# Patient Record
Sex: MALE | Race: BLACK OR AFRICAN AMERICAN | Employment: UNEMPLOYED | URBAN - METROPOLITAN AREA
[De-identification: names, ages, dates, MRNs, and addresses within clinical notes are randomized per-mention and may not be internally consistent; named-entity substitution may affect disease eponyms.]

---

## 2019-08-13 ENCOUNTER — HOSPITAL ENCOUNTER (EMERGENCY)
Facility: CLINIC | Age: 49
Discharge: HOME | End: 2019-08-13
Attending: EMERGENCY MEDICINE | Admitting: EMERGENCY MEDICINE

## 2019-08-13 VITALS
WEIGHT: 191 LBS | HEART RATE: 79 BPM | OXYGEN SATURATION: 100 % | SYSTOLIC BLOOD PRESSURE: 126 MMHG | DIASTOLIC BLOOD PRESSURE: 93 MMHG | RESPIRATION RATE: 16 BRPM | TEMPERATURE: 98.6 F

## 2019-08-13 DIAGNOSIS — F11.20 HEROIN ADDICTION (H): ICD-10-CM

## 2019-08-13 DIAGNOSIS — N28.9 DECREASED RENAL FUNCTION: ICD-10-CM

## 2019-08-13 LAB
ALBUMIN SERPL-MCNC: 4.2 G/DL (ref 3.4–5)
ALP SERPL-CCNC: 86 U/L (ref 40–150)
ALT SERPL W P-5'-P-CCNC: 21 U/L (ref 0–70)
AMPHETAMINES UR QL SCN: NEGATIVE
ANION GAP SERPL CALCULATED.3IONS-SCNC: 10 MMOL/L (ref 3–14)
AST SERPL W P-5'-P-CCNC: 15 U/L (ref 0–45)
BARBITURATES UR QL: NEGATIVE
BASOPHILS # BLD AUTO: 0 10E9/L (ref 0–0.2)
BASOPHILS NFR BLD AUTO: 0.3 %
BENZODIAZ UR QL: NEGATIVE
BILIRUB SERPL-MCNC: 1.2 MG/DL (ref 0.2–1.3)
BUN SERPL-MCNC: 24 MG/DL (ref 7–30)
CALCIUM SERPL-MCNC: 9.2 MG/DL (ref 8.5–10.1)
CANNABINOIDS UR QL SCN: NEGATIVE
CHLORIDE SERPL-SCNC: 104 MMOL/L (ref 94–109)
CO2 SERPL-SCNC: 24 MMOL/L (ref 20–32)
COCAINE UR QL: NEGATIVE
CREAT SERPL-MCNC: 1.98 MG/DL (ref 0.66–1.25)
DIFFERENTIAL METHOD BLD: ABNORMAL
EOSINOPHIL # BLD AUTO: 0.3 10E9/L (ref 0–0.7)
EOSINOPHIL NFR BLD AUTO: 2.3 %
ERYTHROCYTE [DISTWIDTH] IN BLOOD BY AUTOMATED COUNT: 12.7 % (ref 10–15)
ETHANOL UR QL SCN: NEGATIVE
GFR SERPL CREATININE-BSD FRML MDRD: 39 ML/MIN/{1.73_M2}
GLUCOSE SERPL-MCNC: 207 MG/DL (ref 70–99)
HCT VFR BLD AUTO: 50 % (ref 40–53)
HGB BLD-MCNC: 17.2 G/DL (ref 13.3–17.7)
IMM GRANULOCYTES # BLD: 0 10E9/L (ref 0–0.4)
IMM GRANULOCYTES NFR BLD: 0.3 %
LYMPHOCYTES # BLD AUTO: 3.9 10E9/L (ref 0.8–5.3)
LYMPHOCYTES NFR BLD AUTO: 34.7 %
MCH RBC QN AUTO: 32.4 PG (ref 26.5–33)
MCHC RBC AUTO-ENTMCNC: 34.4 G/DL (ref 31.5–36.5)
MCV RBC AUTO: 94 FL (ref 78–100)
MONOCYTES # BLD AUTO: 0.6 10E9/L (ref 0–1.3)
MONOCYTES NFR BLD AUTO: 5 %
NEUTROPHILS # BLD AUTO: 6.5 10E9/L (ref 1.6–8.3)
NEUTROPHILS NFR BLD AUTO: 57.4 %
NRBC # BLD AUTO: 0 10*3/UL
NRBC BLD AUTO-RTO: 0 /100
OPIATES UR QL SCN: POSITIVE
PLATELET # BLD AUTO: 218 10E9/L (ref 150–450)
POTASSIUM SERPL-SCNC: 3.1 MMOL/L (ref 3.4–5.3)
PROT SERPL-MCNC: 8.3 G/DL (ref 6.8–8.8)
RBC # BLD AUTO: 5.31 10E12/L (ref 4.4–5.9)
SODIUM SERPL-SCNC: 138 MMOL/L (ref 133–144)
WBC # BLD AUTO: 11.3 10E9/L (ref 4–11)

## 2019-08-13 PROCEDURE — 80320 DRUG SCREEN QUANTALCOHOLS: CPT | Performed by: EMERGENCY MEDICINE

## 2019-08-13 PROCEDURE — 80053 COMPREHEN METABOLIC PANEL: CPT | Performed by: EMERGENCY MEDICINE

## 2019-08-13 PROCEDURE — 99283 EMERGENCY DEPT VISIT LOW MDM: CPT | Mod: Z6 | Performed by: EMERGENCY MEDICINE

## 2019-08-13 PROCEDURE — 99283 EMERGENCY DEPT VISIT LOW MDM: CPT | Performed by: EMERGENCY MEDICINE

## 2019-08-13 PROCEDURE — 80307 DRUG TEST PRSMV CHEM ANLYZR: CPT | Performed by: EMERGENCY MEDICINE

## 2019-08-13 PROCEDURE — 85025 COMPLETE CBC W/AUTO DIFF WBC: CPT | Performed by: EMERGENCY MEDICINE

## 2019-08-13 PROCEDURE — 25000132 ZZH RX MED GY IP 250 OP 250 PS 637: Performed by: EMERGENCY MEDICINE

## 2019-08-13 RX ORDER — CLONIDINE HYDROCHLORIDE 0.1 MG/1
0.1 TABLET ORAL 2 TIMES DAILY
Qty: 10 TABLET | Refills: 0 | Status: SHIPPED | OUTPATIENT
Start: 2019-08-13 | End: 2019-08-16

## 2019-08-13 RX ORDER — ONDANSETRON 4 MG/1
4 TABLET, ORALLY DISINTEGRATING ORAL EVERY 8 HOURS PRN
Qty: 10 TABLET | Refills: 0 | Status: SHIPPED | OUTPATIENT
Start: 2019-08-13 | End: 2019-08-16

## 2019-08-13 RX ORDER — POTASSIUM CHLORIDE 20MEQ/15ML
20 LIQUID (ML) ORAL ONCE
Status: COMPLETED | OUTPATIENT
Start: 2019-08-13 | End: 2019-08-13

## 2019-08-13 RX ADMIN — POTASSIUM CHLORIDE 20 MEQ: 1.5 SOLUTION ORAL at 14:27

## 2019-08-13 NOTE — ED AVS SNAPSHOT
[facility], [place], Emergency Department  [address]  [place] [address]  Phone:  [phone]  Fax:  [phone]                                    Jose Luis Whatley   MRN: [id number]    Department:  [facility], Emergency Department   Date of Visit:  8/13/2019           After Visit Summary Signature Page    I have received my discharge instructions, and my questions have been answered. I have discussed any challenges I see with this plan with the nurse or doctor.    ..........................................................................................................................................  Patient/Patient Representative Signature      ..........................................................................................................................................  Patient Representative Print Name and Relationship to Patient    ..................................................               ................................................  Date                                   Time    ..........................................................................................................................................  Reviewed by Signature/Title    ...................................................              ..............................................  Date                                               Time          22EPIC Rev 08/18

## 2019-08-13 NOTE — ED PROVIDER NOTES
History     Chief Complaint   Patient presents with     Addiction Problem     seeking detox off heroin(last use last nurys, snorted) uses 1 gram daily     HPI  Jose Luis Whatley is a 49 year old male who presents looking for detox.  Patient believes he has a detox bed on the unit however per intake there is no bed available.  Patient is looking for treatment for heroin abuse.  He denies any symptoms currently he reports using approximately 1/2 g a day, snorting it, no injections.  He has gone through withdrawals in the past which she notes he has vomiting, diarrhea, and diffuse body aches and fatigue.  He denies any of those symptoms currently and states he is currently feeling well.  He has no other medical complaints.    I have reviewed the Medications, Allergies, Past Medical and Surgical History, and Social History in the Epic system.    Review of Systems   All other systems reviewed and are negative.      Physical Exam   BP: (!) 134/90  Pulse: 78  Temp: 97.3  F (36.3  C)  Resp: 14  Weight: 86.6 kg (191 lb)  SpO2: 98 %      Physical Exam   General: awake, alert, NAD  Head: normal cephalic  HEENT: pupils equal, conjugate gaze in tact  Neck: Supple  CV: regular rate and rhythm without murmur  Lungs: clear to ascultation  Abd: soft, non-tender, no guarding, no peritoneal signs  EXT: No deformities noted.  Neuro: awake, answers questions appropriately. No focal deficits noted         ED Course        Procedures             Labs Ordered and Resulted from Time of ED Arrival Up to the Time of Departure from the ED   CBC WITH PLATELETS DIFFERENTIAL - Abnormal; Notable for the following components:       Result Value    WBC 11.3 (*)     All other components within normal limits   COMPREHENSIVE METABOLIC PANEL - Abnormal; Notable for the following components:    Potassium 3.1 (*)     Glucose 207 (*)     Creatinine 1.98 (*)     GFR Estimate 39 (*)     GFR Estimate If Black 45 (*)     All other components within normal  limits            Assessments & Plan (with Medical Decision Making)   Jose Luis is a 49-year-old male who is trying to establish detox for heroin.  Unfortunately he does not have a detox bed on hold like he thought he did.  Patient has no acute complaints.  In anticipation that he was going to go to detox basic labs were obtained.  He has a slightly elevated white count but has no associated infectious complaints so will not work this up any additionally.  He also has a low potassium, will  him on eating high potassium foods and having this rechecked in a couple of days.  He was given 1 dose of oral potassium here in the ER.  Of note patient also had a slightly elevated creatinine of 1.98, no previous in our system to compare to but patient reports he has been told in the past that he has CKD.  I gave him primary care follow-up for this.    Currently he is asymptomatic.  We were able to find a bed available at [address] however patient declined this bed.  Will give a prescription for clonidine and Zofran in the event that he develops symptoms and give them the detox number so they can try tomorrow morning to reserve a bed on detox.  He was encouraged to return to the ER should he develop new or worsening symptoms.    I have reviewed the nursing notes.    I have reviewed the findings, diagnosis, plan and need for follow up with the patient.    There are no discharge medications for this patient.      Final diagnoses:   Heroin addiction (H)   Decreased renal function       8/13/2019   [facility], [place], EMERGENCY DEPARTMENT     Tab Persaud MD  08/13/19 5499

## 2019-08-13 NOTE — DISCHARGE INSTRUCTIONS
No detox bed is available today.  I will give you clonidine and Zofran for symptomatic management should your withdrawal symptoms start and encourage you to call detox tomorrow morning at [phone] to reserve a bed.  Also your potassium was slightly low and your kidney function was slightly low, unclear if this is a new problem or an old problem as we have no previous comparisons but you should make sure this is followed.  I would start by establishing primary care and having labs rechecked.  Further testing may be necessary in the future.    Please make an appointment to follow up with [facility] (phone: [phone] OR [facility] (phone: [phone]) as soon as possible. Return to the ED if unable to tolerate p.o. or new or worsening symptoms.

## 2019-08-16 ENCOUNTER — HOSPITAL ENCOUNTER (INPATIENT)
Facility: CLINIC | Age: 49
LOS: 3 days | Discharge: HOME | DRG: 897 | End: 2019-08-19
Attending: PSYCHIATRY & NEUROLOGY | Admitting: PSYCHIATRY & NEUROLOGY

## 2019-08-16 DIAGNOSIS — F11.20 UNCOMPLICATED OPIOID DEPENDENCE (H): ICD-10-CM

## 2019-08-16 PROBLEM — F19.10 SUBSTANCE ABUSE (H): Status: ACTIVE | Noted: 2019-08-16

## 2019-08-16 LAB
ALBUMIN SERPL-MCNC: 3.8 G/DL (ref 3.4–5)
ALCOHOL BREATH TEST: 0.03 (ref 0–0.01)
ALP SERPL-CCNC: 80 U/L (ref 40–150)
ALT SERPL W P-5'-P-CCNC: 20 U/L (ref 0–70)
AMPHETAMINES UR QL SCN: NEGATIVE
ANION GAP SERPL CALCULATED.3IONS-SCNC: 8 MMOL/L (ref 3–14)
AST SERPL W P-5'-P-CCNC: 12 U/L (ref 0–45)
BARBITURATES UR QL: NEGATIVE
BASOPHILS # BLD AUTO: 0 10E9/L (ref 0–0.2)
BASOPHILS NFR BLD AUTO: 0.4 %
BENZODIAZ UR QL: NEGATIVE
BILIRUB SERPL-MCNC: 0.8 MG/DL (ref 0.2–1.3)
BUN SERPL-MCNC: 17 MG/DL (ref 7–30)
CALCIUM SERPL-MCNC: 8.6 MG/DL (ref 8.5–10.1)
CANNABINOIDS UR QL SCN: NEGATIVE
CHLORIDE SERPL-SCNC: 108 MMOL/L (ref 94–109)
CO2 SERPL-SCNC: 24 MMOL/L (ref 20–32)
COCAINE UR QL: NEGATIVE
CREAT SERPL-MCNC: 1.15 MG/DL (ref 0.66–1.25)
DIFFERENTIAL METHOD BLD: NORMAL
EOSINOPHIL # BLD AUTO: 0.2 10E9/L (ref 0–0.7)
EOSINOPHIL NFR BLD AUTO: 2.7 %
ERYTHROCYTE [DISTWIDTH] IN BLOOD BY AUTOMATED COUNT: 12.3 % (ref 10–15)
ETHANOL UR QL SCN: NEGATIVE
GFR SERPL CREATININE-BSD FRML MDRD: 75 ML/MIN/{1.73_M2}
GLUCOSE SERPL-MCNC: 90 MG/DL (ref 70–99)
HCT VFR BLD AUTO: 43.2 % (ref 40–53)
HGB BLD-MCNC: 15.2 G/DL (ref 13.3–17.7)
IMM GRANULOCYTES # BLD: 0 10E9/L (ref 0–0.4)
IMM GRANULOCYTES NFR BLD: 0.1 %
LYMPHOCYTES # BLD AUTO: 3 10E9/L (ref 0.8–5.3)
LYMPHOCYTES NFR BLD AUTO: 44.8 %
MCH RBC QN AUTO: 32.5 PG (ref 26.5–33)
MCHC RBC AUTO-ENTMCNC: 35.2 G/DL (ref 31.5–36.5)
MCV RBC AUTO: 93 FL (ref 78–100)
MONOCYTES # BLD AUTO: 0.3 10E9/L (ref 0–1.3)
MONOCYTES NFR BLD AUTO: 5 %
NEUTROPHILS # BLD AUTO: 3.2 10E9/L (ref 1.6–8.3)
NEUTROPHILS NFR BLD AUTO: 47 %
NRBC # BLD AUTO: 0 10*3/UL
NRBC BLD AUTO-RTO: 0 /100
OPIATES UR QL SCN: POSITIVE
PLATELET # BLD AUTO: 213 10E9/L (ref 150–450)
POTASSIUM SERPL-SCNC: 3.8 MMOL/L (ref 3.4–5.3)
PROT SERPL-MCNC: 7.6 G/DL (ref 6.8–8.8)
RBC # BLD AUTO: 4.67 10E12/L (ref 4.4–5.9)
SODIUM SERPL-SCNC: 140 MMOL/L (ref 133–144)
WBC # BLD AUTO: 6.8 10E9/L (ref 4–11)

## 2019-08-16 PROCEDURE — 80307 DRUG TEST PRSMV CHEM ANLYZR: CPT | Performed by: PSYCHIATRY & NEUROLOGY

## 2019-08-16 PROCEDURE — HZ2ZZZZ DETOXIFICATION SERVICES FOR SUBSTANCE ABUSE TREATMENT: ICD-10-PCS | Performed by: NURSE PRACTITIONER

## 2019-08-16 PROCEDURE — 99285 EMERGENCY DEPT VISIT HI MDM: CPT | Performed by: PSYCHIATRY & NEUROLOGY

## 2019-08-16 PROCEDURE — 99285 EMERGENCY DEPT VISIT HI MDM: CPT | Mod: Z6 | Performed by: PSYCHIATRY & NEUROLOGY

## 2019-08-16 PROCEDURE — 80053 COMPREHEN METABOLIC PANEL: CPT | Performed by: PSYCHIATRY & NEUROLOGY

## 2019-08-16 PROCEDURE — 12800008 ZZH R&B CD ADULT

## 2019-08-16 PROCEDURE — 82075 ASSAY OF BREATH ETHANOL: CPT | Performed by: PSYCHIATRY & NEUROLOGY

## 2019-08-16 PROCEDURE — 85025 COMPLETE CBC W/AUTO DIFF WBC: CPT | Performed by: PSYCHIATRY & NEUROLOGY

## 2019-08-16 PROCEDURE — 80320 DRUG SCREEN QUANTALCOHOLS: CPT | Performed by: PSYCHIATRY & NEUROLOGY

## 2019-08-16 RX ORDER — BISACODYL 10 MG
10 SUPPOSITORY, RECTAL RECTAL DAILY PRN
Status: DISCONTINUED | OUTPATIENT
Start: 2019-08-16 | End: 2019-08-19 | Stop reason: HOSPADM

## 2019-08-16 RX ORDER — ONDANSETRON 4 MG/1
4 TABLET, ORALLY DISINTEGRATING ORAL EVERY 6 HOURS PRN
Status: DISCONTINUED | OUTPATIENT
Start: 2019-08-16 | End: 2019-08-19 | Stop reason: HOSPADM

## 2019-08-16 RX ORDER — TRAZODONE HYDROCHLORIDE 50 MG/1
50 TABLET, FILM COATED ORAL
Status: DISCONTINUED | OUTPATIENT
Start: 2019-08-16 | End: 2019-08-19 | Stop reason: HOSPADM

## 2019-08-16 RX ORDER — LOPERAMIDE HCL 2 MG
2 CAPSULE ORAL 4 TIMES DAILY PRN
Status: DISCONTINUED | OUTPATIENT
Start: 2019-08-16 | End: 2019-08-19 | Stop reason: HOSPADM

## 2019-08-16 RX ORDER — ALUMINA, MAGNESIA, AND SIMETHICONE 2400; 2400; 240 MG/30ML; MG/30ML; MG/30ML
30 SUSPENSION ORAL EVERY 4 HOURS PRN
Status: DISCONTINUED | OUTPATIENT
Start: 2019-08-16 | End: 2019-08-19 | Stop reason: HOSPADM

## 2019-08-16 RX ORDER — HYDROXYZINE HYDROCHLORIDE 25 MG/1
25 TABLET, FILM COATED ORAL EVERY 4 HOURS PRN
Status: DISCONTINUED | OUTPATIENT
Start: 2019-08-16 | End: 2019-08-19 | Stop reason: HOSPADM

## 2019-08-16 RX ORDER — BUPRENORPHINE 2 MG/1
4 TABLET SUBLINGUAL ONCE
Status: COMPLETED | OUTPATIENT
Start: 2019-08-17 | End: 2019-08-17

## 2019-08-16 RX ORDER — POLYETHYLENE GLYCOL 3350 17 G
2 POWDER IN PACKET (EA) ORAL
Status: DISCONTINUED | OUTPATIENT
Start: 2019-08-16 | End: 2019-08-19 | Stop reason: HOSPADM

## 2019-08-16 RX ORDER — CLONIDINE HYDROCHLORIDE 0.1 MG/1
0.1 TABLET ORAL EVERY 6 HOURS PRN
Status: DISCONTINUED | OUTPATIENT
Start: 2019-08-16 | End: 2019-08-19 | Stop reason: HOSPADM

## 2019-08-16 RX ORDER — ACETAMINOPHEN 325 MG/1
650 TABLET ORAL EVERY 4 HOURS PRN
Status: DISCONTINUED | OUTPATIENT
Start: 2019-08-16 | End: 2019-08-19 | Stop reason: HOSPADM

## 2019-08-16 SDOH — HEALTH STABILITY: MENTAL HEALTH: HOW OFTEN DO YOU HAVE A DRINK CONTAINING ALCOHOL?: NEVER

## 2019-08-16 ASSESSMENT — ACTIVITIES OF DAILY LIVING (ADL)
FALL_HISTORY_WITHIN_LAST_SIX_MONTHS: NO
SWALLOWING: 0-->SWALLOWS FOODS/LIQUIDS WITHOUT DIFFICULTY
HYGIENE/GROOMING: INDEPENDENT
BATHING: 0-->INDEPENDENT
COGNITION: 0 - NO COGNITION ISSUES REPORTED
ORAL_HYGIENE: INDEPENDENT
AMBULATION: 0-->INDEPENDENT
RETIRED_COMMUNICATION: 0-->UNDERSTANDS/COMMUNICATES WITHOUT DIFFICULTY
DRESS: INDEPENDENT
RETIRED_EATING: 0-->INDEPENDENT
TOILETING: 0-->INDEPENDENT
DRESS: 0-->INDEPENDENT
TRANSFERRING: 0-->INDEPENDENT

## 2019-08-16 ASSESSMENT — ENCOUNTER SYMPTOMS
HALLUCINATIONS: 0
SHORTNESS OF BREATH: 0
NERVOUS/ANXIOUS: 0
DYSPHORIC MOOD: 0
ABDOMINAL PAIN: 0
FEVER: 0

## 2019-08-16 ASSESSMENT — MIFFLIN-ST. JEOR: SCORE: 1797.05

## 2019-08-16 NOTE — ED PROVIDER NOTES
History     Chief Complaint   Patient presents with     Addiction Problem     Has a bed on detox for Heroin use, intake has been notified pt is in ED. Uses 1.5 grams daily, smoked. Last use: today around 9:30 am. Denies history of seizures.      The history is provided by the patient and medical records.     Jose Luis Whatley is a 49 year old male who comes in due to him wanting to go to detox.  He has been using 1.5 grams of heroin a day for the last 4 years. He snorts it.  His last use was at 930 am today. He denies any withdrawal symptoms yet. He denies any history of seizures.  He states he has one working kidney but could not explain what is wrong with the other kidney. He denies any depression or anxiety.  He denies any suicidal or homicidal thoughts.  He denies any other drug use. He does occasionally have a few beers during social events.  He had a beer this morning with a breathalyzer of 0.025.  He denies this is a regular occurrence.    I have reviewed the Medications, Allergies, Past Medical and Surgical History, and Social History in the Epic system.    Review of Systems   Constitutional: Negative for fever.   Respiratory: Negative for shortness of breath.    Cardiovascular: Negative for chest pain.   Gastrointestinal: Negative for abdominal pain.   Psychiatric/Behavioral: Negative for dysphoric mood, hallucinations, self-injury and suicidal ideas. The patient is not nervous/anxious.    All other systems reviewed and are negative.      Physical Exam   BP: 126/75  Pulse: 75  Temp: 97.9  F (36.6  C)  Resp: 16  Weight: 88 kg (194 lb)  SpO2: 99 %      Physical Exam   Constitutional: He appears well-developed and well-nourished.   Cardiovascular: Normal rate, regular rhythm and normal heart sounds.   Pulmonary/Chest: Effort normal and breath sounds normal. No respiratory distress.   Psychiatric: He has a normal mood and affect. His speech is normal and behavior is normal. Judgment and thought content  normal. He is not actively hallucinating. Thought content is not paranoid and not delusional. Cognition and memory are normal. He expresses no homicidal and no suicidal ideation. He expresses no suicidal plans and no homicidal plans.   Nursing note and vitals reviewed.      ED Course        Procedures               Labs Ordered and Resulted from Time of ED Arrival Up to the Time of Departure from the ED   DRUG ABUSE SCREEN 6 CHEM DEP URINE ([facility]) - Abnormal; Notable for the following components:       Result Value    Opiates Qualitative Urine Positive (*)     All other components within normal limits   ALCOHOL BREATH TEST POCT - Abnormal; Notable for the following components:    Alcohol Breath Test 0.025 (*)     All other components within normal limits   CBC WITH PLATELETS DIFFERENTIAL   COMPREHENSIVE METABOLIC PANEL            Assessments & Plan (with Medical Decision Making)   Jose Luis will be admitted to the hospital for detox on station 3a under Dr. Mart.    I have reviewed the nursing notes.    I have reviewed the findings, diagnosis, plan and need for follow up with the patient.    New Prescriptions    No medications on file       Final diagnoses:   Uncomplicated opioid dependence (H)       8/16/2019   [facility], [place], EMERGENCY DEPARTMENT     Darius Alfaro MD  08/16/19 1854

## 2019-08-16 NOTE — ED NOTES
ED to Behavioral Floor Handoff    SITUATION  Jose Luis Whatley is a 49 year old male who speaks English and lives in a home with others The patient arrived in the ED by private car from home with a complaint of Addiction Problem (Has a bed on detox for Heroin use, intake has been notified pt is in ED. Uses 1.5 grams daily, smoked. Last use: today around 9:30 am. Denies history of seizures. )  .The patient's current symptoms started/worsened a while ago and during this time the symptoms have remained the same.   In the ED, pt was diagnosed with   Final diagnoses:   Uncomplicated opioid dependence (H)        Initial vitals were: BP: 126/75  Pulse: 75  Temp: 97.9  F (36.6  C)  Resp: 16  Weight: 88 kg (194 lb)  SpO2: 99 %   --------  Is the patient diabetic? No   If yes, last blood glucose? --     If yes, was this treated in the ED? --  --------  Is the patient inebriated (ETOH) No or Impaired on other substances? No  MSSA done? N/A  Last MSSA score: --    Were withdrawal symptoms treated? N/A  Does the patient have a seizure history? No. If yes, date of most recent seizure--  --------  Is the patient patient experiencing suicidal ideation? denies current or recent suicidal ideation     Homicidal ideation? denies current or recent homicidal ideation or behaviors.    Self-injurious behavior/urges? denies current or recent self injurious behavior or ideation.  ------  Was pt aggressive in the ED No  Was a code called No  Is the pt now cooperative? Yes  -------  Meds given in ED: Medications - No data to display   Family present during ED course? No  Family currently present? No    BACKGROUND  Does the patient have a cognitive impairment or developmental disability? No  Allergies: No Known Allergies.   Social demographics are   Social History     Socioeconomic History     Marital status: Single     Spouse name: None     Number of children: None     Years of education: None     Highest education level: None   Occupational  History     None   Social Needs     Financial resource strain: None     Food insecurity:     Worry: None     Inability: None     Transportation needs:     Medical: None     Non-medical: None   Tobacco Use     Smoking status: Current Every Day Smoker     Smokeless tobacco: Never Used   Substance and Sexual Activity     Alcohol use: Never     Frequency: Never     Drug use: Yes     Types: Opiates     Sexual activity: None   Lifestyle     Physical activity:     Days per week: None     Minutes per session: None     Stress: None   Relationships     Social connections:     Talks on phone: None     Gets together: None     Attends Cheondo[employer] service: None     Active member of club or organization: None     Attends meetings of clubs or organizations: None     Relationship status: None     Intimate partner violence:     Fear of current or ex partner: None     Emotionally abused: None     Physically abused: None     Forced sexual activity: None   Other Topics Concern     None   Social History Narrative     None        ASSESSMENT  Labs results   Labs Ordered and Resulted from Time of ED Arrival Up to the Time of Departure from the ED   DRUG ABUSE SCREEN 6 CHEM DEP URINE ([facility]) - Abnormal; Notable for the following components:       Result Value    Opiates Qualitative Urine Positive (*)     All other components within normal limits   ALCOHOL BREATH TEST POCT - Abnormal; Notable for the following components:    Alcohol Breath Test 0.025 (*)     All other components within normal limits   CBC WITH PLATELETS DIFFERENTIAL   COMPREHENSIVE METABOLIC PANEL      Imaging Studies: No results found for this or any previous visit (from the past 24 hour(s)).   Most recent vital signs /75   Pulse 75   Temp 97.9  F (36.6  C) (Oral)   Resp 16   Wt 88 kg (194 lb)   SpO2 99%    Abnormal labs/tests/findings requiring intervention:---   Pain control: pt had none  Nausea control: pt had none    RECOMMENDATION  Are any infection precautions  needed (MRSA, VRE, etc.)? No If yes, what infection? --  ---  Does the patient have mobility issues? independently. If yes, what device does the pt use? ---  ---  Is patient on 72 hour hold or commitment? No If on 72 hour hold, have hold and rights been given to patient? N/A  Are admitting orders written if after 10 p.m. ?N/A  Tasks needing to be completed:---     Zaki Perera    [phone] [facility]   [phone] [facility]

## 2019-08-16 NOTE — PROGRESS NOTES
08/16/19 1844   Patient Belongings   Did you bring any home meds/supplements to the hospital?  No   Patient Belongings locker;other (see comments)   Patient Belongings Put in Hospital Secure Location (Security or Locker, etc.) other (see comments)   Belongings Search Yes   Clothing Search Yes   Second Staff John and Darius    Comment see note    Storage Bin   Shoes w/laces, belt, hat, durag, sunglasses  Medical Bin  Cell phone, wallet, No    Security Envelope  Nothing!!  A             Admission:  I am responsible for any personal items that are not sent to the safe or pharmacy.  [facility] is not responsible for loss, theft or damage of any property in my possession.  Signature:  _________________________________ Date: _______  Time: _____                                              Staff Signature:  ____________________________ Date: ________  Time: _____      2nd Staff person, if patient is unable/unwilling to sign:    Signature: ________________________________ Date: ________  Time: _____   Discharge:  [facility] has returned all of my personal belongings:  Signature: _________________________________ Date: ________  Time: _____                                          Staff Signature:  ____________________________ Date: ________  Time: _____

## 2019-08-16 NOTE — PHARMACY-ADMISSION MEDICATION HISTORY
Admission medication history for the August 16, 2019 admission is complete.     Medication history interview sources: patient, [employer]    Medication compliance: N/A - patient not prescribed any routine medications    Changes made to PTA medication list (reason)  Added: none  Deleted:   - clonidine 0.1 mg BID (prescribed 5 day supply in ED on 8/13/19 for opioid withdrawal - patient did not fill)  - ondansetron 4 mg ODT q8h PRN (prescribed #10 in ED on 8/13/19 - patient did not fill)  Changed: none    Additional medication history information (including reliability of information, actions taken by pharmacist):  - Patient denies taking/being prescribed prescription medications and over-the-counter (OTC) products such as vitamins, sleep aids, etc.      Prior to Admission medications    Not on File       Time spent: 15 minutes    Medication history completed by:   Pedro Washburn, PharmD

## 2019-08-17 LAB
T4 FREE SERPL-MCNC: 1.09 NG/DL (ref 0.76–1.46)
TSH SERPL DL<=0.005 MIU/L-ACNC: 0.22 MU/L (ref 0.4–4)

## 2019-08-17 PROCEDURE — 99222 1ST HOSP IP/OBS MODERATE 55: CPT | Mod: AI | Performed by: PSYCHIATRY & NEUROLOGY

## 2019-08-17 PROCEDURE — 84443 ASSAY THYROID STIM HORMONE: CPT | Performed by: NURSE PRACTITIONER

## 2019-08-17 PROCEDURE — 25000131 ZZH RX MED GY IP 250 OP 636 PS 637: Performed by: NURSE PRACTITIONER

## 2019-08-17 PROCEDURE — 25000132 ZZH RX MED GY IP 250 OP 250 PS 637: Performed by: NURSE PRACTITIONER

## 2019-08-17 PROCEDURE — 99232 SBSQ HOSP IP/OBS MODERATE 35: CPT | Performed by: NURSE PRACTITIONER

## 2019-08-17 PROCEDURE — 25000132 ZZH RX MED GY IP 250 OP 250 PS 637: Performed by: PSYCHIATRY & NEUROLOGY

## 2019-08-17 PROCEDURE — 36415 COLL VENOUS BLD VENIPUNCTURE: CPT | Performed by: NURSE PRACTITIONER

## 2019-08-17 PROCEDURE — 99207 ZZC CDG-HISTORY COMP: MEETS EXP. PROBLEM FOCUSED - DOWN CODED LACK OF HPI: CPT | Performed by: NURSE PRACTITIONER

## 2019-08-17 PROCEDURE — 84439 ASSAY OF FREE THYROXINE: CPT | Performed by: NURSE PRACTITIONER

## 2019-08-17 PROCEDURE — 12800008 ZZH R&B CD ADULT

## 2019-08-17 PROCEDURE — 99207 ZZC CDG-MDM COMPONENT: MEETS MODERATE - DOWN CODED: CPT | Performed by: PSYCHIATRY & NEUROLOGY

## 2019-08-17 RX ORDER — BUPRENORPHINE 2 MG/1
4 TABLET SUBLINGUAL 2 TIMES DAILY
Status: DISCONTINUED | OUTPATIENT
Start: 2019-08-17 | End: 2019-08-19 | Stop reason: HOSPADM

## 2019-08-17 RX ORDER — NALOXONE HYDROCHLORIDE 0.4 MG/ML
.1-.4 INJECTION, SOLUTION INTRAMUSCULAR; INTRAVENOUS; SUBCUTANEOUS
Status: DISCONTINUED | OUTPATIENT
Start: 2019-08-17 | End: 2019-08-19 | Stop reason: HOSPADM

## 2019-08-17 RX ADMIN — BUPRENORPHINE HYDROCHLORIDE 4 MG: 2 TABLET SUBLINGUAL at 20:22

## 2019-08-17 RX ADMIN — BUPRENORPHINE HCL 4 MG: 2 TABLET SUBLINGUAL at 09:31

## 2019-08-17 RX ADMIN — ONDANSETRON 4 MG: 4 TABLET, ORALLY DISINTEGRATING ORAL at 20:21

## 2019-08-17 RX ADMIN — NICOTINE POLACRILEX 2 MG: 2 LOZENGE ORAL at 12:21

## 2019-08-17 ASSESSMENT — ACTIVITIES OF DAILY LIVING (ADL)
ORAL_HYGIENE: INDEPENDENT
LAUNDRY: WITH SUPERVISION
DRESS: SCRUBS (BEHAVIORAL HEALTH);INDEPENDENT
HYGIENE/GROOMING: HANDWASHING;INDEPENDENT

## 2019-08-17 NOTE — H&P
Admitted:     08/16/2019      CHIEF COMPLAINT:  A 49-year-old man admitted for opiate dependence here to get on to buprenorphine and wants to be on buprenorphine maintenance outside the hospital.      HISTORY OF PRESENT ILLNESS:  The patient is a 49-year-old man who presents for buprenorphine initiation.  He is not interested in residential treatment.  He just wants to get into a Suboxone clinic.  He has been snorting between a gram and 1.5 grams of heroin daily.  He is a daily tobacco user.  Denies other illicit drug use.  The patient has been using more than intended despite intentions to quit.  He has notable withdrawal symptoms.      PAST PSYCHIATRIC HISTORY:  The patient denies any psychiatric issues outside of his chemical dependency.  Denies being in treatment in the past.  Denies being in detox in the past.  Denies past suicide attempts.      PAST MEDICAL HISTORY:  The patient has 1 functioning kidney, subclinical hypothyroidism noted on admission this time.  No other medical issues.      SUBSTANCE HISTORY:  Substance use as noted in HPI.      PHYSICAL REVIEW OF SYSTEMS:  The patient currently denies problems on 10-point review of systems except as noted in HPI.      FAMILY HISTORY:  The patient denies any family history of chemical dependency, mental health or suicides.      SOCIAL HISTORY:  The patient is not currently employed.  He lives with his girlfriend.  He reports that this is a safe and stable and supportive living environment.      ALLERGIES:  NO KNOWN DRUG ALLERGIES.      PRIOR TO ADMISSION MEDICATIONS:  None.      LABORATORY DATA:  Comprehensive metabolic panel within normal limits.  TSH is 0.22 with free T4 in the normal range at 1.09.  Glucose is 90.  CBC with differential within normal limits.  Alcohol breath was 0.025.  Urine toxicology is positive for opiates, negative for amphetamines, cocaine, cannabinoids, barbiturates, benzodiazepines and alcohol.      VITAL SIGNS:  Temperature 98.9,  pulse is 65, respiratory rate is 16, blood pressure is 134/78, oxygen saturation 100% on room air.      PHYSICAL EXAMINATION:  I reviewed physical exam as documented by Internal Medicine APRN, Heather Duran, dated 08/17/2019.  I have no additional findings at this time.      MENTAL STATUS EXAMINATION:  The patient is awake, alert, oriented to person, place and date.  He is wearing hospital scrubs.  He has fair eye contact.  He is cooperative throughout the interview.  He has a paucity of spontaneous speech.  Language is intact.  Mood appears somewhat down.  Affect is blunted, congruent to mood.  He has no psychomotor agitation or retardation.  Muscle strength and tone and gait and station are within normal limits.  Thought process is linear and goal oriented.  Associations are intact.  Thought content is currently negative for suicidal thoughts, homicidal thoughts or signs of psychosis.  Recent and remote memory are intact.  Fund of knowledge is adequate.  Attention and concentration are intact.  Insight is fair, judgment is fair.      DIAGNOSES:   1.  Opiate withdrawal.   2.  Opiate use disorder, severe.   3.  Subclinical hypothyroidism.   4.  History of one functioning kidney.   5.  Cigarette nicotine dependence, currently in withdrawal.      PLAN:   1.  Continue with opiate withdrawal monitoring with most recent score being 5.  This will help ensure where him on the proper buprenorphine dose at the time of discharge.   2.  Continue with buprenorphine 4 mg twice daily, had his first dose this morning.   3.  Follow Internal Medicine recommendations for management of medical issues.  Currently they have signed off and did not indicate further inpatient followup is needed.   4.  Continue with nicotine lozenges for his nicotine withdrawal.  Will need further education about smoking cessation.   5.  Legal:  The patient is currently voluntary.   6.  Disposition:  When the patient has outpatient followup in place, likely  will discharge to home.         HANNAH GARCIA MD             D: 2019   T: 2019   MT: MICHELLE      Name:     REJI PARTIDA   MRN:      [id number]        Account:      [id number]   :      1970        Admitted:     2019                   Document: [id number]

## 2019-08-17 NOTE — PROGRESS NOTES
Internal Medicine Consult - Initial Visit       Jose Luis Whatley MRN# [id number]   YOB: 1970 Age: 49 year old   Date of Admission: 8/16/2019  PCP: No Ref-Primary, Physician  Date of Service: 8/17/2019    Referring Provider: Maribel Mart MD  Reason for Consult:  Medical co-management of detox          Assessment and Recommendations:   Jose Luis Whatley is a 49 year old male with a history of polysubstance abuse and solitary kidney function admitted for detox from heroin.      # Detox from heroin - Pt reports snorting heroin, last used yesterday AM.  Denies IVDU.    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  No urinary issues.  Cr 1.15, BUN 17, GFR 87, CrCl 98.8 this admission.      # Subclinical hyperthyroidism - TSH slightly low at 0.22, free T4 wnl.  Possibly 2/2 substance use.   - Recheck TFFs in 4 weeks w/ PCP (will need assistance with finding PCP)     Medicine will sign off, no further recommendations at this time.  Please feel free to reconsult if patient's symptoms worsen or if new problems arise.  Thank you for the opportunity to care for this patient.       Heather Duran, CNP, APRN  Internal Medicine [facility] Hospitalist  [facility]  Pager [phone]           History of Present Illness:   History is obtained from the patient and medical record.     This patient is a 49 year old male with a history of polysubstance abuse and solitary kidney function admitted for detox from heroin.        Internal Medicine service was asked to see patient for assistance with medical co-management of detox.  Jose Luis is pleasant and cooperative.  He is feeling okay apart from typical withdrawal symptoms.  He is yawning quite frequently.  Currently he denies chest pain, dyspnea, abdominal pain, nausea, and vomiting.  Denies any chronic medical issues, but does  "report that his left kidney doesn't work due to \"a birth defect\".           Review of Systems:   A 10 point ROS was performed and negative unless otherwise noted in HPI.           Past Medical History:   Reviewed and updated in Epic.  Past Medical History:   Diagnosis Date     Kidney dysfunction     No L kidney function     Substance abuse (H)              Past Surgical History:   Reviewed and updated in Epic.  Past Surgical History:   Procedure Laterality Date     NO HISTORY OF SURGERY               Social History:   Reviewed and updated in [facility].  Social History     Socioeconomic History     Marital status: Single     Spouse name: Not on file     Number of children: Not on file     Years of education: Not on file     Highest education level: Not on file   Occupational History     Not on file   Social Needs     Financial resource strain: Not on file     Food insecurity:     Worry: Not on file     Inability: Not on file     Transportation needs:     Medical: Not on file     Non-medical: Not on file   Tobacco Use     Smoking status: Current Every Day Smoker     Smokeless tobacco: Never Used   Substance and Sexual Activity     Alcohol use: Never     Frequency: Never     Drug use: Yes     Types: Opiates     Sexual activity: Not on file   Lifestyle     Physical activity:     Days per week: Not on file     Minutes per session: Not on file     Stress: Not on file   Relationships     Social connections:     Talks on phone: Not on file     Gets together: Not on file     Attends [employer] service: Not on file     Active member of club or organization: Not on file     Attends meetings of clubs or organizations: Not on file     Relationship status: Not on file     Intimate partner violence:     Fear of current or ex partner: Not on file     Emotionally abused: Not on file     Physically abused: Not on file     Forced sexual activity: Not on file   Other Topics Concern     Not on file   Social History Narrative     Not on file "              Family History:   Reviewed and updated in Epic.  History reviewed. No pertinent family history.          Allergies:   No Known Allergies          Medications:     Current Facility-Administered Medications   Medication     acetaminophen (TYLENOL) tablet 650 mg     alum & mag hydroxide-simethicone (MYLANTA ES/MAALOX  ES) suspension 30 mL     bisacodyl (DULCOLAX) Suppository 10 mg     buprenorphine (SUBUTEX) sublingual tablet 4 mg     cloNIDine (CATAPRES) tablet 0.1 mg     hydrOXYzine (ATARAX) tablet 25 mg     loperamide (IMODIUM) capsule 2 mg     magnesium hydroxide (MILK OF MAGNESIA) suspension 30 mL     nicotine (COMMIT) lozenge 2 mg     ondansetron (ZOFRAN-ODT) ODT tab 4 mg     traZODone (DESYREL) tablet 50 mg            Physical Exam:   Blood pressure 126/87, pulse 68, temperature 98.6  F (37  C), temperature source Tympanic, resp. rate 16, height 1.829 m (6'), weight 88.9 kg (196 lb), SpO2 100 %.  Body mass index is 26.58 kg/m .    GENERAL: Alert and oriented x 3. Well nourished, well developed.  No acute distress.    HEENT: Normocephalic, atraumatic. Anicteric sclera. Mucous membranes moist.   CV: RRR. S1, S2. No murmurs appreciated.   RESPIRATORY: Effort normal on room air. Lungs CTAB with no wheezing, rales, or rhonchi.   GI: Abdomen soft and non distended, bowel sounds present x all 4 quadrants. No tenderness, rebound, or guarding.   NEUROLOGICAL: No focal deficits. Follows commands.  Strength equal in upper and lower extremities.   MUSCULOSKELETAL: No joint swelling or tenderness. Moves all extremities.   EXTREMITIES: No gross deformities. No peripheral edema. Intact bilateral pedal pulses.   SKIN: Grossly warm, dry, and intact. No jaundice. No rashes.             Data:   I personally reviewed the following studies:    ROUTINE IP LABS (Last four results)  CMP   Recent Labs   Lab 08/16/19  1540 08/13/19  1315    138   POTASSIUM 3.8 3.1*   CHLORIDE 108 104   CO2 24 24   ANIONGAP 8 10   GLC  90 207*   BUN 17 24   CR 1.15 1.98*   SAMIA 8.6 9.2   PROTTOTAL 7.6 8.3   ALBUMIN 3.8 4.2   BILITOTAL 0.8 1.2   ALKPHOS 80 86   AST 12 15   ALT 20 21     CBC   Recent Labs   Lab 08/16/19  1540 08/13/19  1315   WBC 6.8 11.3*   RBC 4.67 5.31   HGB 15.2 17.2   HCT 43.2 50.0   MCV 93 94   MCH 32.5 32.4   MCHC 35.2 34.4   RDW 12.3 12.7    218     INR No lab results found in last 7 days.          Unresulted Labs Ordered in the Past 30 Days of this Admission     Date and Time Order Name Status Description    8/17/2019 0708 T4 free In process

## 2019-08-17 NOTE — PROGRESS NOTES
Case Management Note  8/17/2019    Met with pt to discuss discharge planning. Pt reports he has a plan to go home. Pt wants to be sober. [facility] Reports this is a safe and stable living situation. Writer asked pt if he was interested in suboxone maintenance. Pt had not heard of suboxone, is interested in it. Pt has no insurance, business office will need to see. Spoke with pt about suboxone options including [facility], [facility], and [facility] (addiction medicine as well as the opioid use disorder program for  Americans). Pt interested in these options and will continue to work with CM.     Maki Hicks, CASSIE, [facility]

## 2019-08-17 NOTE — PLAN OF CARE
"49 year old male voluntarily admitted to [facility] to detox from heroin. Pt reported snorting 1.5g of heroin daily. Last use was at 9:30am 8/16/2019. Pt reported that he started using heroin when he was 18 years old and this is his first time going through detox. Pt also reported drinking 1-2 beers a couple times a month. Pt denied any other chemical use. Utox was positive for opiates. Smokes 1PPD; nicotine lozenge ordered.    Pt denied any medical problems. He did report having 1 functioning kidney due to a \"birth defect.\" Pt denied any allergies.     COWS=2; pt endorsed stomach cramps and slight body aches. VS: /84; P 68; T 97.4; R 16.    We'll continue to monitor.  "

## 2019-08-17 NOTE — CONSULTS
Internal Medicine Consult - Initial Visit        Jose Luis Whatley MRN# [id number]   YOB: 1970 Age: 49 year old   Date of Admission: 8/16/2019  PCP: No Ref-Primary, Physician  Date of Service: 8/17/2019     Referring Provider: Maribel Mart MD  Reason for Consult:  Medical co-management of detox           Assessment and Recommendations:   Jose Luis Whatley is a 49 year old male with a history of polysubstance abuse and solitary kidney function admitted for detox from heroin.       # Detox from heroin - Pt reports snorting heroin, last used yesterday AM.  Denies IVDU.    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  No urinary issues.  Cr 1.15, BUN 17, GFR 87, CrCl 98.8 this admission.       # Subclinical hyperthyroidism - TSH slightly low at 0.22, free T4 wnl.  Possibly 2/2 substance use.   - Recheck TFFs in 4 weeks w/ PCP (will need assistance with finding PCP)      Medicine will sign off, no further recommendations at this time.  Please feel free to reconsult if patient's symptoms worsen or if new problems arise.  Thank you for the opportunity to care for this patient.         Heather Duran, CNP, APRN  Internal Medicine [facility] Hospitalist  [facility]  Pager [phone]             History of Present Illness:   History is obtained from the patient and medical record.      This patient is a 49 year old male with a history of polysubstance abuse and solitary kidney function admitted for detox from heroin.          Internal Medicine service was asked to see patient for assistance with medical co-management of detox.  Jose Luis is pleasant and cooperative.  He is feeling okay apart from typical withdrawal symptoms.  He is yawning quite frequently.  Currently he denies chest pain, dyspnea, abdominal pain, nausea, and vomiting.  Denies any chronic medical  "issues, but does report that his left kidney doesn't work due to \"a birth defect\".            Review of Systems:   A 10 point ROS was performed and negative unless otherwise noted in HPI.            Past Medical History:   Reviewed and updated in Epic.  Past Medical History        Past Medical History:   Diagnosis Date     Kidney dysfunction       No L kidney function     Substance abuse (H)                    Past Surgical History:   Reviewed and updated in Epic.  Past Surgical History   Past Surgical History:   Procedure Laterality Date     NO HISTORY OF SURGERY                      Social History:   Reviewed and updated in [facility].  Social History   Social History            Socioeconomic History     Marital status: Single       Spouse name: Not on file     Number of children: Not on file     Years of education: Not on file     Highest education level: Not on file   Occupational History     Not on file   Social Needs     Financial resource strain: Not on file     Food insecurity:       Worry: Not on file       Inability: Not on file     Transportation needs:       Medical: Not on file       Non-medical: Not on file   Tobacco Use     Smoking status: Current Every Day Smoker     Smokeless tobacco: Never Used   Substance and Sexual Activity     Alcohol use: Never       Frequency: Never     Drug use: Yes       Types: Opiates     Sexual activity: Not on file   Lifestyle     Physical activity:       Days per week: Not on file       Minutes per session: Not on file     Stress: Not on file   Relationships     Social connections:       Talks on phone: Not on file       Gets together: Not on file       Attends Restorationist service: Not on file       Active member of club or organization: Not on file       Attends meetings of clubs or organizations: Not on file       Relationship status: Not on file     Intimate partner violence:       Fear of current or ex partner: Not on file       Emotionally abused: Not on file       " Physically abused: Not on file       Forced sexual activity: Not on file   Other Topics Concern     Not on file   Social History Narrative     Not on file                  Family History:   Reviewed and updated in Epic.  Family History   History reviewed. No pertinent family history.              Allergies:   No Known Allergies           Medications:          Current Facility-Administered Medications   Medication     acetaminophen (TYLENOL) tablet 650 mg     alum & mag hydroxide-simethicone (MYLANTA ES/MAALOX  ES) suspension 30 mL     bisacodyl (DULCOLAX) Suppository 10 mg     buprenorphine (SUBUTEX) sublingual tablet 4 mg     cloNIDine (CATAPRES) tablet 0.1 mg     hydrOXYzine (ATARAX) tablet 25 mg     loperamide (IMODIUM) capsule 2 mg     magnesium hydroxide (MILK OF MAGNESIA) suspension 30 mL     nicotine (COMMIT) lozenge 2 mg     ondansetron (ZOFRAN-ODT) ODT tab 4 mg     traZODone (DESYREL) tablet 50 mg              Physical Exam:   Blood pressure 126/87, pulse 68, temperature 98.6  F (37  C), temperature source Tympanic, resp. rate 16, height 1.829 m (6'), weight 88.9 kg (196 lb), SpO2 100 %.  Body mass index is 26.58 kg/m .     GENERAL: Alert and oriented x 3. Well nourished, well developed.  No acute distress.    HEENT: Normocephalic, atraumatic. Anicteric sclera. Mucous membranes moist.   CV: RRR. S1, S2. No murmurs appreciated.   RESPIRATORY: Effort normal on room air. Lungs CTAB with no wheezing, rales, or rhonchi.   GI: Abdomen soft and non distended, bowel sounds present x all 4 quadrants. No tenderness, rebound, or guarding.   NEUROLOGICAL: No focal deficits. Follows commands.  Strength equal in upper and lower extremities.   MUSCULOSKELETAL: No joint swelling or tenderness. Moves all extremities.   EXTREMITIES: No gross deformities. No peripheral edema. Intact bilateral pedal pulses.   SKIN: Grossly warm, dry, and intact. No jaundice. No rashes.               Data:   I personally reviewed the following  studies:     ROUTINE IP LABS (Last four results)  CMP        Recent Labs   Lab 08/16/19  1540 08/13/19  1315    138   POTASSIUM 3.8 3.1*   CHLORIDE 108 104   CO2 24 24   ANIONGAP 8 10   GLC 90 207*   BUN 17 24   CR 1.15 1.98*   SAMIA 8.6 9.2   PROTTOTAL 7.6 8.3   ALBUMIN 3.8 4.2   BILITOTAL 0.8 1.2   ALKPHOS 80 86   AST 12 15   ALT 20 21      CBC        Recent Labs   Lab 08/16/19  1540 08/13/19  1315   WBC 6.8 11.3*   RBC 4.67 5.31   HGB 15.2 17.2   HCT 43.2 50.0   MCV 93 94   MCH 32.5 32.4   MCHC 35.2 34.4   RDW 12.3 12.7    218      INR No lab results found in last 7 days.                     Unresulted Labs Ordered in the Past 30 Days of this Admission      Date and Time Order Name Status Description     8/17/2019 0708 T4 free In process

## 2019-08-17 NOTE — PLAN OF CARE
RN Assessment  Patient isolated to room and self, for the majority of the shift, mostly napping. Initially withdrawn on approach, but overall pleasant and cooperative.  Denies anxiety or depressive thoughts or feelings. Affect full range on approach but blunted when observed. Noted to be alert and oriented x 4. Thought pattern logical, clear, and coherent.  Denies thoughts of dying/not being alive or active suicidal thoughts. COWS  score this shift 5 and 4 , medicated per orders and given Subutex 4 mg once.    Refer to case manger notes for discharge planing and recommendations. Continue with current treatment plan and recommendations. Continue to monitor and reassess symptoms. Monitor response to medications. Monitor progress towards treatment goals. Encourage groups and participation.

## 2019-08-18 PROCEDURE — 25000132 ZZH RX MED GY IP 250 OP 250 PS 637: Performed by: PSYCHIATRY & NEUROLOGY

## 2019-08-18 PROCEDURE — 25000131 ZZH RX MED GY IP 250 OP 636 PS 637: Performed by: NURSE PRACTITIONER

## 2019-08-18 PROCEDURE — H2032 ACTIVITY THERAPY, PER 15 MIN: HCPCS

## 2019-08-18 PROCEDURE — 12800008 ZZH R&B CD ADULT

## 2019-08-18 RX ADMIN — ONDANSETRON 4 MG: 4 TABLET, ORALLY DISINTEGRATING ORAL at 14:16

## 2019-08-18 RX ADMIN — NICOTINE POLACRILEX 2 MG: 2 LOZENGE ORAL at 08:02

## 2019-08-18 RX ADMIN — ONDANSETRON 4 MG: 4 TABLET, ORALLY DISINTEGRATING ORAL at 20:45

## 2019-08-18 RX ADMIN — BUPRENORPHINE HYDROCHLORIDE 4 MG: 2 TABLET SUBLINGUAL at 20:44

## 2019-08-18 RX ADMIN — ONDANSETRON 4 MG: 4 TABLET, ORALLY DISINTEGRATING ORAL at 08:02

## 2019-08-18 RX ADMIN — BUPRENORPHINE HYDROCHLORIDE 4 MG: 2 TABLET SUBLINGUAL at 08:02

## 2019-08-18 ASSESSMENT — ACTIVITIES OF DAILY LIVING (ADL)
HYGIENE/GROOMING: INDEPENDENT
ORAL_HYGIENE: INDEPENDENT
ORAL_HYGIENE: INDEPENDENT;PROMPTS
LAUNDRY: WITH SUPERVISION
DRESS: SCRUBS (BEHAVIORAL HEALTH)
DRESS: INDEPENDENT;PROMPTS
LAUNDRY: UNABLE TO COMPLETE
HYGIENE/GROOMING: INDEPENDENT;PROMPTS

## 2019-08-18 NOTE — PROGRESS NOTES
08/18/19 1800   Therapeutic Recreation   Type of Intervention structured groups   Activity leisure education   Response Participates, initiates socially appropriate   Hours 1     Pt participated in Therapeutic Recreation group with focus on stress reduction, leisure education, and acquisition of knowledge and skills. Pt was fully engaged and cooperative in group activity creating a collaborative leisure inventory. Pt participated through the entire duration of the group. Pt discussed a few healthy interests enjoyed during free time during group discussion. Showed progress in session goals. Pt mood was calm and was appropriate with interactions.

## 2019-08-18 NOTE — PROGRESS NOTES
Patient isolated himself all shift, observed as depressed but denied having depression on approach. Patient denied all mental health symptoms. He appeared to be minimizing his symptoms. Patient's hygiene was poor and neglected. Withdrawal assessment was completed and patient's COW score was 3 and 1.   Recommendation: Patient needs mental health evaluation.

## 2019-08-19 VITALS
TEMPERATURE: 98.1 F | SYSTOLIC BLOOD PRESSURE: 127 MMHG | OXYGEN SATURATION: 100 % | BODY MASS INDEX: 26.55 KG/M2 | WEIGHT: 196 LBS | DIASTOLIC BLOOD PRESSURE: 87 MMHG | HEIGHT: 72 IN | RESPIRATION RATE: 16 BRPM | HEART RATE: 64 BPM

## 2019-08-19 PROCEDURE — 25000132 ZZH RX MED GY IP 250 OP 250 PS 637: Performed by: NURSE PRACTITIONER

## 2019-08-19 PROCEDURE — 25000131 ZZH RX MED GY IP 250 OP 636 PS 637: Performed by: NURSE PRACTITIONER

## 2019-08-19 PROCEDURE — 25000132 ZZH RX MED GY IP 250 OP 250 PS 637: Performed by: PSYCHIATRY & NEUROLOGY

## 2019-08-19 PROCEDURE — 99238 HOSP IP/OBS DSCHRG MGMT 30/<: CPT | Performed by: PSYCHIATRY & NEUROLOGY

## 2019-08-19 RX ORDER — BUPRENORPHINE AND NALOXONE 4; 1 MG/1; MG/1
1 FILM, SOLUBLE BUCCAL; SUBLINGUAL 2 TIMES DAILY
Qty: 20 FILM | Refills: 0 | Status: SHIPPED | OUTPATIENT
Start: 2019-08-19 | End: 2019-08-29

## 2019-08-19 RX ADMIN — ONDANSETRON 4 MG: 4 TABLET, ORALLY DISINTEGRATING ORAL at 10:09

## 2019-08-19 RX ADMIN — CLONIDINE HYDROCHLORIDE 0.1 MG: 0.1 TABLET ORAL at 13:21

## 2019-08-19 RX ADMIN — BUPRENORPHINE HYDROCHLORIDE 4 MG: 2 TABLET SUBLINGUAL at 07:40

## 2019-08-19 RX ADMIN — HYDROXYZINE HYDROCHLORIDE 25 MG: 25 TABLET ORAL at 13:21

## 2019-08-19 RX ADMIN — BUPRENORPHINE HYDROCHLORIDE 4 MG: 2 TABLET SUBLINGUAL at 13:22

## 2019-08-19 NOTE — PROGRESS NOTES
Pt is scheduled for Suboxone appointment with Dr. Ribera at [facility] on 8/23 @ 6675.  Pt and RN informed.  AVS completed.

## 2019-08-19 NOTE — PLAN OF CARE
"Behavioral Team Discussion: (8/19/2019)    Continued Stay Criteria/Rationale: Patient admitted for opiate Use Disorder.  Plan: The following services will be provided to the patient; psychiatric assessment, medication management, therapeutic milieu, individual and group support, and skills groups.   Participants: 3A Provider: Dr. Maribel Mart MD; 3A RN's: Rupesh Luis, RN; 3A CM's: Jenifer Ahmadi .  Summary/Recommendation: Providers will assess today for treatment recommendations, discharge planning, and aftercare plans. CM will meet with pt for discharge planning.   Medical/Physical: 1 functioning kidney due to a \"birth defect.\"  Precautions:   Behavioral Orders   Procedures     Code 1 - Restrict to Unit     Routine Programming     As clinically indicated     Status 15     Every 15 minutes.     Withdrawal precautions     Rationale for change in precautions or plan: N/A  Progress: No Change.    "

## 2019-08-19 NOTE — DISCHARGE INSTRUCTIONS
Behavioral Discharge Planning and Instructions  THANK YOU FOR CHOOSING THE [facility]  [facility]  [phone]    Summary: You were admitted to [facility] on 8/16/19 for detoxification from alcohol and opiates.  A medical exam was performed that included lab work. You have met with a  and opted to begin Suboxone maintenance.  Please take care and make your recovery a priority, Abrams!    Recommendation:  If you need more support to maintain sobriety call [phone] to schedule a chemical assessment and follow the recommendations of that assessment.    Main Diagnosis: Opioid Dependence    Major Treatments, Procedures and Findings:  You have withdrawn from opioids using the appropriate protocol.  You have met with a  to develop a treatment plan for discharge.  You have had labs drawn and those results have been reviewed with you. Please take a copy of your lab work with you to your next primary care physician appointment.    Symptoms to Report:  If you experience more anxiety, confusion, sleeplessness, deep sadness or thoughts of suicide, notify your treatment team or notify your primary care physician. IF ANY OF THE SYMPTOMS YOU ARE EXPERIENCING ARE A MEDICAL EMERGENCY CALL 911 IMMEDIATELY.     Lifestyle Adjustment: Adjust your lifestyle to get enough sleep, relaxation, exercise and  good nutrition. Continue to develop healthy coping skills to decrease stress and promote a sober living environment. Do not use alcohol, illegal drugs or addictive medications other than what is currently prescribed. AA, NA, and  Sponsor are excellent resources for support.     Disposition: Home/Suboxone maintenance    Medical/Suboxone Follow-up: Friday, August 23 @ 8:40 AM (please arrive 15 minutes early)    Psychiatrist/Primary Care Giver: Dr. Ribera    Address: [address] [place] [address]  # [phone]      DISCHARGE RESOURCES: -SMART Recovery - self management for addiction recovery:   www.smartrecovery.org    -Pathways ~ A Health Crisis Resource & Support Center: 352.472.1168.  -[facility] [phone]   -[facility],[place] [facility] 879-887-9590 or 517-436-1712.  -Suicide Awareness Voices of Education (SAVE) (www.save.org): 788-471-THCO (0792)  -National Suicide Prevention Line (www.mentalhealthmn.org): 627-233-XVIY (5835)  -National [place] on Mental Illness (www.mn.duane.org): 580.676.7279 or 295-342-6325.  -Ozoi7gkvz: text the word LIFE to 36583 for immediate support and crisis intervention  -Mental Health Consumer/Survivor Network of [place] (www.mhcsn.net): 693.588.4871 or 891-800-4292  -Mental Health Association of [place] (www.mentalhealth.org): 127.866.8942 or 553-159-0852     -Substance Abuse and Mental Health Services (www.samhsa.gov)  -Harm Reduction Coalition (www. Harmreduction.org)  -www.prescribetoprevent.org or http://prescribetoprevent.org/video  -Poison control 4-723-046-5656  **[place] Opioid Prevention Coalition: www.opioidcoalition.org    Sober Support Group Information:  AA/NA & Sponsor/Support  -Alcoholics Anonymous (www.alcoholics-anonymous.org): for local information 24 hours/day  -AA Intergroup service office in [place] (http://www.aastpaul.org/) 445.957.8909  -AA Intergroup service office in [facility]: 171.542.2128. (http://www.a[place].org/)  -Narcotics Anonymous (www.nam[place].org) (878) 368-2608   **Sober Fun Activities: www.sober-activities.WIB/[facility]//[facility] Recovery Connection (MRC)  [facility] connects people seeking recovery to resources that help foster and sustain long-term recovery.  Whether you are seeking resources for treatment, transportation, housing, job training, education, health care or other pathways to recovery, [facility] is a great place to start.    Phone: 496.482.5004.  www.[facility]..org (Great listing of all types of recovery-related resources)    General Medication Instructions:   See your medication sheet(s) for instructions.   Take all medicines as directed.  Make no changes unless your doctor suggests them.   Go to all your doctor visits. Be sure to have all your required lab tests. This way, your medicines can be refilled on time.  Do not use any drugs not prescribed by your provider.  AA/NA and Sponsors are excellent resources for support. Avoid alcohol.    Any follow up concerns: Nursing questions call the Unit -[facility] [phone]  Medical Record call [phone] Outpatient Behavioral Intake call [phone]  LP+ Wait List/Bed Availability call [phone]    The entire treatment team has appreciated the opportunity to work with you Jose Luis.  We wish you the best in the future and with your lifelong recovery goals. Please bring this discharge folder with you to all follow up appointments.  It contains your lab results, diagnosis, medication list and discharge recommendations.  THANK YOU FOR CHOOSING THE [facility]

## 2019-08-19 NOTE — DISCHARGE SUMMARY
Psychiatric Discharge Summary    Jose Luis Whatley MRN# [id number]   Age: 49 year old YOB: 1970     Date of Admission:  8/16/2019  Date of Discharge:  8/19/2019  Admitting Physician:  Maribel Mart MD  Discharge Physician:  Maribel Mart MD         Event Leading to Hospitalization:       The patient is a 49-year-old man who presents for buprenorphine initiation.  He is not interested in residential treatment.  He just wants to get into a Suboxone clinic.  He has been snorting between a gram and 1.5 grams of heroin daily.  He is a daily tobacco user.  Denies other illicit drug use.  The patient has been using more than intended despite intentions to quit.  He has notable withdrawal symptoms.   The patient is a 49-year-old man who presents for buprenorphine initiation.  He is not interested in residential treatment.  He just wants to get into a Suboxone clinic.  He has been snorting between a gram and 1.5 grams of heroin daily.  He is a daily tobacco user.  Denies other illicit drug use.  The patient has been using more than intended despite intentions to quit.  He has notable withdrawal symptoms.  The patient denies any psychiatric issues outside of his chemical dependency.  Denies being in treatment in the past.  Denies being in detox in the past.  Denies past suicide attempts.   The patient has 1 functioning kidney, subclinical hypothyroidism noted on admission this time.  No other medical issues.  The patient denies any family history of chemical dependency, mental health or suicides. The patient is not currently employed.  He lives with his girlfriend.  He reports that this is a safe and stable and supportive living environment.     See Admission note by Lee Ordonez MD on 8/17/2019 for additional details.          Diagnoses:     1.  Opiate use disorder, severe.  2.  Opiate withdrawal.   3.  Subclinical hypothyroidism.   4.  History of one functioning kidney.   5.  Cigarette nicotine  dependence, currently in withdrawal.          Labs:     Recent Results (from the past 168 hour(s))   CBC with platelets differential    Collection Time: 08/13/19  1:15 PM   Result Value Ref Range    WBC 11.3 (H) 4.0 - 11.0 10e9/L    RBC Count 5.31 4.4 - 5.9 10e12/L    Hemoglobin 17.2 13.3 - 17.7 g/dL    Hematocrit 50.0 40.0 - 53.0 %    MCV 94 78 - 100 fl    MCH 32.4 26.5 - 33.0 pg    MCHC 34.4 31.5 - 36.5 g/dL    RDW 12.7 10.0 - 15.0 %    Platelet Count 218 150 - 450 10e9/L    Diff Method Automated Method     % Neutrophils 57.4 %    % Lymphocytes 34.7 %    % Monocytes 5.0 %    % Eosinophils 2.3 %    % Basophils 0.3 %    % Immature Granulocytes 0.3 %    Nucleated RBCs 0 0 /100    Absolute Neutrophil 6.5 1.6 - 8.3 10e9/L    Absolute Lymphocytes 3.9 0.8 - 5.3 10e9/L    Absolute Monocytes 0.6 0.0 - 1.3 10e9/L    Absolute Eosinophils 0.3 0.0 - 0.7 10e9/L    Absolute Basophils 0.0 0.0 - 0.2 10e9/L    Abs Immature Granulocytes 0.0 0 - 0.4 10e9/L    Absolute Nucleated RBC 0.0    Comprehensive metabolic panel    Collection Time: 08/13/19  1:15 PM   Result Value Ref Range    Sodium 138 133 - 144 mmol/L    Potassium 3.1 (L) 3.4 - 5.3 mmol/L    Chloride 104 94 - 109 mmol/L    Carbon Dioxide 24 20 - 32 mmol/L    Anion Gap 10 3 - 14 mmol/L    Glucose 207 (H) 70 - 99 mg/dL    Urea Nitrogen 24 7 - 30 mg/dL    Creatinine 1.98 (H) 0.66 - 1.25 mg/dL    GFR Estimate 39 (L) >60 mL/min/[1.73_m2]    GFR Estimate If Black 45 (L) >60 mL/min/[1.73_m2]    Calcium 9.2 8.5 - 10.1 mg/dL    Bilirubin Total 1.2 0.2 - 1.3 mg/dL    Albumin 4.2 3.4 - 5.0 g/dL    Protein Total 8.3 6.8 - 8.8 g/dL    Alkaline Phosphatase 86 40 - 150 U/L    ALT 21 0 - 70 U/L    AST 15 0 - 45 U/L   Drug abuse screen 6 urine (tox)    Collection Time: 08/13/19  1:21 PM   Result Value Ref Range    Amphetamine Qual Urine Negative NEG^Negative    Barbiturates Qual Urine Negative NEG^Negative    Benzodiazepine Qual Urine Negative NEG^Negative    Cannabinoids Qual Urine Negative  NEG^Negative    Cocaine Qual Urine Negative NEG^Negative    Ethanol Qual Urine Negative NEG^Negative    Opiates Qualitative Urine Positive (A) NEG^Negative   Drug abuse screen 6 urine (chem dep)    Collection Time: 08/16/19  3:20 PM   Result Value Ref Range    Amphetamine Qual Urine Negative NEG^Negative    Barbiturates Qual Urine Negative NEG^Negative    Benzodiazepine Qual Urine Negative NEG^Negative    Cannabinoids Qual Urine Negative NEG^Negative    Cocaine Qual Urine Negative NEG^Negative    Ethanol Qual Urine Negative NEG^Negative    Opiates Qualitative Urine Positive (A) NEG^Negative   CBC with platelets differential    Collection Time: 08/16/19  3:40 PM   Result Value Ref Range    WBC 6.8 4.0 - 11.0 10e9/L    RBC Count 4.67 4.4 - 5.9 10e12/L    Hemoglobin 15.2 13.3 - 17.7 g/dL    Hematocrit 43.2 40.0 - 53.0 %    MCV 93 78 - 100 fl    MCH 32.5 26.5 - 33.0 pg    MCHC 35.2 31.5 - 36.5 g/dL    RDW 12.3 10.0 - 15.0 %    Platelet Count 213 150 - 450 10e9/L    Diff Method Automated Method     % Neutrophils 47.0 %    % Lymphocytes 44.8 %    % Monocytes 5.0 %    % Eosinophils 2.7 %    % Basophils 0.4 %    % Immature Granulocytes 0.1 %    Nucleated RBCs 0 0 /100    Absolute Neutrophil 3.2 1.6 - 8.3 10e9/L    Absolute Lymphocytes 3.0 0.8 - 5.3 10e9/L    Absolute Monocytes 0.3 0.0 - 1.3 10e9/L    Absolute Eosinophils 0.2 0.0 - 0.7 10e9/L    Absolute Basophils 0.0 0.0 - 0.2 10e9/L    Abs Immature Granulocytes 0.0 0 - 0.4 10e9/L    Absolute Nucleated RBC 0.0    Comprehensive metabolic panel    Collection Time: 08/16/19  3:40 PM   Result Value Ref Range    Sodium 140 133 - 144 mmol/L    Potassium 3.8 3.4 - 5.3 mmol/L    Chloride 108 94 - 109 mmol/L    Carbon Dioxide 24 20 - 32 mmol/L    Anion Gap 8 3 - 14 mmol/L    Glucose 90 70 - 99 mg/dL    Urea Nitrogen 17 7 - 30 mg/dL    Creatinine 1.15 0.66 - 1.25 mg/dL    GFR Estimate 75 >60 mL/min/[1.73_m2]    GFR Estimate If Black 87 >60 mL/min/[1.73_m2]    Calcium 8.6 8.5 - 10.1  mg/dL    Bilirubin Total 0.8 0.2 - 1.3 mg/dL    Albumin 3.8 3.4 - 5.0 g/dL    Protein Total 7.6 6.8 - 8.8 g/dL    Alkaline Phosphatase 80 40 - 150 U/L    ALT 20 0 - 70 U/L    AST 12 0 - 45 U/L   Alcohol breath test POCT    Collection Time: 08/16/19  3:50 PM   Result Value Ref Range    Alcohol Breath Test 0.025 (A) 0.00 - 0.01   TSH with free T4 reflex and/or T3 as indicated    Collection Time: 08/17/19  7:08 AM   Result Value Ref Range    TSH 0.22 (L) 0.40 - 4.00 mU/L   T4 free    Collection Time: 08/17/19  7:08 AM   Result Value Ref Range    T4 Free 1.09 0.76 - 1.46 ng/dL          Consults:   Jose Luis Whatley is a 49 year old male with a history of polysubstance abuse and solitary kidney function admitted for detox from heroin.       # Detox from heroin - Pt reports snorting heroin, last used yesterday AM.  Denies IVDU.    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  No urinary issues.  Cr 1.15, BUN 17, GFR 87, CrCl 98.8 this admission.       # Subclinical hyperthyroidism - TSH slightly low at 0.22, free T4 wnl.  Possibly 2/2 substance use.   - Recheck TFFs in 4 weeks w/ PCP (will need assistance with finding PCP)      Medicine will sign off, no further recommendations at this time.  Please feel free to reconsult if patient's symptoms worsen or if new problems arise.  Thank you for the opportunity to care for this patient.      Heather Duran, CNP, APRN  Internal Medicine [facility] Hospitalist       Hospital Course:   Jose Luis Whatley was admitted to Detox Unit with attending Maribel Mart MD as a voluntary patient. The patient was placed under status 15 (15 minute checks) to ensure patient safety. The patient declined referral to CD treatment. He requested discharge once out of detox and sought referral to Suboxone maintenance. Patient  did not require seclusion or administration of emergency  medications to manage behavior. The patient was place on alcohol withdrawal but maintained low scores and did not exhibit any withdrawal. He was placed on opiate with drawal protocol vis Subutex which was transitioned to Suboxone 4-1 mg BID #10 days on discharge. He was [facility] for opiate maintenance.     The patient tolerated medications well. Reported mood symptoms resolved. The patient was active on the unit. The patient was social, engaged and attended groups. No overt latrell, confusion or psychosis noted. The patient maintained denial of SI, HI and LORI. The patient slept well. Appetite was intact. The patient was compliant with medications and care.     Jose Luis Whatley will be released to home. At the time of this encounter, Jose Luis Whatley was determined to not be a danger to himself or others and symptoms did not meet criteria for involuntary hospitalization.  The patient denied depression, anxiety, racing thoughts and irritability. The patient denied hallucinations and paranoia. The patient was future oriented and denied SI, LORI and HI. The patient noted tolerating medications well.    Steps taken to minimize risk include: assessing patient s behavior and thought process daily during hospital stay, discharging patient with adequate plan for follow up for mental and physical health, and discussing safety plan of returning to the hospital or calling 911, should the patient ever has thoughts of harming self or others. Therefore, based on all available evidence including the factors cited above, the patient does not appear to be at imminent risk for self-harm, and is appropriate for outpatient level of care.  The patient agreed to continue medications and outpatient care.     Because this patient meets criteria for an Alcohol Use Disorder, I performed the following brief intervention on the date of this note:   1) Expressed concern that the patient is drinking at unhealthy levels known to increase  their risk of alcohol related problems   2) Gave feedback linking alcohol use and health, including personalized feedback explaining how alcohol use can interact with their medical and/or psychiatric problems, and with prescribed medications.   3) Advised patient to abstain.         Discharge Medications:     Current Discharge Medication List      START taking these medications    Details   buprenorphine HCl-naloxone HCl (SUBOXONE) 4-1 MG per film Place 1 Film under the tongue 2 times daily for 10 days  Qty: 20 Film, Refills: 0    Associated Diagnoses: Uncomplicated opioid dependence (H)                Psychiatric and Physical Examinations:   Appearance:  awake, alert, adequately groomed, appeared as age stated and no apparent distress  Attitude:  cooperative  Eye Contact:  good  Mood:  good  Affect:  appropriate and in normal range, full range and reactive  Speech:  clear, coherent and normal prosody  Psychomotor Behavior:  no evidence of tardive dyskinesia, dystonia, or tics and intact station, gait and muscle tone  Thought Process:  linear  Associations:  no loose associations  Thought Content:  no evidence of suicidal ideation or homicidal ideation and no evidence of psychotic thought  Insight:  fair  Judgment:  intact  Oriented to:  time, person, and place  Attention Span and Concentration:  intact  Recent and Remote Memory:  intact  Language and Fund of Knowledge: appropriate  Muscle Strength and Tone: normal  Gait and Station: Normal  Vitals:    08/18/19 1115 08/18/19 1616 08/18/19 2023 08/19/19 0738   BP: 115/76 132/86 (!) 145/101 131/85   Pulse: 68 61 64 62   Resp: 16 16 16 16   Temp: 98  F (36.7  C) 98  F (36.7  C) 99.1  F (37.3  C) 98  F (36.7  C)   TempSrc: Oral Oral Oral Oral   SpO2:   100% 100%   Weight:       Height:              Discharge Plan:   Disposition: Home/Suboxone maintenance     Medical/Suboxone Follow-up: Friday, August 23 @ 8:40 AM (please arrive 15 minutes early)    Psychiatrist/Primary  Care Giver: Dr. Ribera    Address: [address] [facility]  # [phone]       DISCHARGE RESOURCES: -SMART Recovery - self management for addiction recovery:  www.smartrecovery.org    -[facility] ~ A Health Crisis Resource & Support Center: 961.799.7791.  -[facility] [phone]   -[facility] 718-199-1210 or 376-008-7407.  -Suicide Awareness Voices of Education (SAVE) (www.save.org): 090-955-RMUT (6532)  -National Suicide Prevention Line (www.mentalhealthmn.org): 772-308-MJJM (9190)  -National [place] on Mental Illness (www.mn.duane.org): 821.574.3972 or 622-994-2781.  -[employer]: text the word LIFE to 97826 for immediate support and crisis intervention  -Mental Health Consumer/Survivor Network of [place] (www.mhcsn.net): 942.687.2499 or 372-262-8481  -Mental Health Association of [place] (www.mentalhealth.org): 641.105.8840 or 604-108-4310     -Substance Abuse and Mental Health Services (www.samhsa.gov)  -Harm Reduction Coalition (www. Harmreduction.org)  -www.prescribetoprevent.org or http://prescribetoprevent.org/video  -Poison control 9-826-549-7840  **[place] Opioid Prevention Coalition: www.opioidcoalition.org     Sober Support Group Information:  AA/NA & Sponsor/Support  -Alcoholics Anonymous (www.alcoholics-anonymous.org): for local information 24 hours/day  -AA Intergroup service office in [place] (http://www.aastpaul.org/) 464.736.6860  -AA Intergroup service office in [facility]: 546.730.5121. (http://www.a[place].org/)  -Narcotics Anonymous (www.nam[place].org) (634) 563-3134   **Sober Fun Activities: www.soberSlime Sandwichactivities.[employer]/[facility]//[facility] Recovery Connection ([facility])  MRC connects people seeking recovery to resources that help foster and sustain long-term recovery.  Whether you are seeking resources for treatment, transportation, housing, job training, education, health care or other pathways to recovery, [facility]C is a great  place to start.    Phone: 738.270.1847.  www.[facility].Dapt (Great listing of all types of recovery-related resources)     Attestation:  The patient has been seen and evaluated by me,  Maribel Mart MD

## 2019-08-19 NOTE — PLAN OF CARE
Patient stayed in room most of the time this shift sleeping.  Flat affect, denies SI/SIB/HI.  Opiates score 5 and 6.  Received prn Zofran per request for nausea.  Patient went back to bed after taking his medications.  No aggressive behavior noted, will continue to monitor closely.